# Patient Record
Sex: FEMALE | Race: WHITE
[De-identification: names, ages, dates, MRNs, and addresses within clinical notes are randomized per-mention and may not be internally consistent; named-entity substitution may affect disease eponyms.]

---

## 2018-10-01 ENCOUNTER — HOSPITAL ENCOUNTER (EMERGENCY)
Dept: HOSPITAL 74 - JER | Age: 53
LOS: 1 days | Discharge: HOME | End: 2018-10-02
Payer: COMMERCIAL

## 2018-10-01 VITALS — DIASTOLIC BLOOD PRESSURE: 75 MMHG | TEMPERATURE: 98 F | HEART RATE: 74 BPM | SYSTOLIC BLOOD PRESSURE: 127 MMHG

## 2018-10-01 VITALS — BODY MASS INDEX: 26.6 KG/M2

## 2018-10-01 DIAGNOSIS — L30.8: ICD-10-CM

## 2018-10-01 DIAGNOSIS — R21: Primary | ICD-10-CM

## 2018-10-02 NOTE — PDOC
Attending Attestation





- Resident


Resident Name: Claude Ramírez





- ED Attending Attestation


I have performed the following: I have examined & evaluated the patient, The 

case was reviewed & discussed with the resident, I agree w/resident's findings 

& plan, Exceptions are as noted





- HPI


HPI: 





54 yo F presents with rash to her face. She denies any new skin care products, 

foods,

## 2018-10-02 NOTE — PDOC
History of Present Illness





- General


Chief Complaint: Rash


Stated Complaint: RASH


Time Seen by Provider: 10/02/18 00:53





- History of Present Illness


Initial Comments: 





10/02/18 00:56


54 yo F with no significant pmh who p/w facial rash. Patient reports facial 

rash beginning Saturday 09/30/18. Rash is progressive, pruitic, involving the 

entire face, with absent involvement of eyes, or mouth. Symptoms not relieved 

with Diphehydramine x 2 Monday (10/01/18). Denies new topical emollients, soap, 

shampoo, makeup, detergents, clothing, bedding. denies recent outdoor exposure, 

traveling, change in diet. No known food or drug allergies. Patient reports h/o 

similar symptoms x 1 year ago. 





Patient denies N/V, F,C, CP, cough, hoarseness, muffled voice, tongue swelling, 

difficulty swallowing, wheezing, hemoptysis, Palpitations, SOB, urinary 

complaints, abdominal pain, diarrhea, constipation, lightheadedness, weakness, 

sensory changes. 





PMHx: as noted above


ROS: as noted


SHx: Denies Etoh, tobacco, IVDA


Allergies: NKDA














Past History





- Past Medical History


Allergies/Adverse Reactions: 


 Allergies











Allergy/AdvReac Type Severity Reaction Status Date / Time


 


No Known Allergies Allergy   Verified 10/01/18 23:16











Home Medications: 


Ambulatory Orders





Fluconazole [Diflucan] 200 mg PO ONCE #0 tablet 09/01/13 


Nitrofurantoin Monohyd/M-Cryst [Macrobid] 100 mg PO BID #14 capsule 09/01/13 


Prednisone [Prednisone 50 MG TABLETS] 50 mg PO DAILY #5 tablet MDD 1 tab 10/02/

18 








COPD: No





- Suicide/Smoking/Psychosocial Hx


Smoking Status: No


Smoking History: Never smoked


Number of Cigarettes Smoked Daily: 0





**Review of Systems





- Review of Systems


Comments:: 





10/02/18 00:56


GENERAL/CONSTITUTIONAL: No fever or chills. No weakness.


HEAD, EYES, EARS, NOSE AND THROAT: No change in vision. No ear pain or 

discharge. No sore throat.


CARDIOVASCULAR: No chest pain or shortness of breath


RESPIRATORY: No cough, wheezing, or hemoptysis.


GASTROINTESTINAL: No nausea, vomiting, diarrhea or constipation.


GENITOURINARY: No dysuria, frequency, or change in urination.


MUSCULOSKELETAL: No joint or muscle swelling or pain. No neck or back pain.


SKIN:+ Facial Rash.


NEUROLOGIC: No headache, vertigo, loss of consciousness, or change in strength/

sensation.


ENDOCRINE: No increased thirst. No abnormal weight change


HEMATOLOGIC/LYMPHATIC: No anemia, easy bleeding, or history of blood clots.


ALLERGIC/IMMUNOLOGIC: No hives or skin allergy.











*Physical Exam





- Vital Signs


 Last Vital Signs











Temp Pulse Resp BP Pulse Ox


 


 98 F   74   18   127/75   99 


 


 10/01/18 23:13  10/01/18 23:13  10/01/18 23:13  10/01/18 23:13  10/01/18 23:13














- Physical Exam


Comments: 





10/02/18 00:56


GENERAL: Awake, alert, and fully oriented, in no acute distress


HEAD: No signs of trauma, normocephalic, atraumatic 


FACE: Widespread erythematous, pruitic, papular, urticarial rash with perioral 

involvement/predominance, and sparing of forehead, eyes, mouth, mucosa. Absent 

crusting/oozing. 


EYES: PERRLA, EOMI, sclera anicteric, conjunctiva clear


ENT: Auricles normal inspection, hearing grossly normal, nares patent, 

oropharynx clear without


exudates. Moist mucosa


NECK: Normal ROM, supple, no lymphadenopathy, JVD, or masses


LUNGS: No distress, speaks full sentences, clear to auscultation bilaterally 


HEART: Regular rate and rhythm, normal S1 and S2, no murmurs, rubs or gallops, 

peripheral pulses normal and equal bilaterally. 


EXTREMITIES : Normal inspection, Normal range of motion, no edema.  No clubbing 

or cyanosis. 


SKIN: Warm, Dry, normal turgor.








Medical Decision Making





- Medical Decision Making





10/02/18 01:34


54 yo F with no significant pmh who p/w facial rash. VSS, AF. Lungs CTA. + 

diffuse facial papular, urticaria, with sparing of mucosa, and eyes. Low 

suspicion anaphylaxis. Able to tolerate oral secretions. Absent hoarseness, 

muffled voice, or mucosal edema. Low suspicion cellulitis. Likely acute atopic 

or contact dermatitis. 





Ed Course: 


Ranitidine, Prednisone 60 mg 


Sent Prednisone to pharmacy x 5 days


Patient stable for d/c with return precautions. 





*DC/Admit/Observation/Transfer


Diagnosis at time of Disposition: 


 Contact allergic reaction, Facial rash








- Discharge Dispostion


Disposition: HOME


Condition at time of disposition: Stable


Decision to Admit order: No





- Referrals


Referrals: 


Arturo Galo MD [Primary Care Provider] - 





- Patient Instructions


Printed Discharge Instructions:  DI for Rash


Additional Instructions: 


Please return to the emergency department with any new or worsening symptoms or 

concerns. Please follow up with your primary care physician within 72 hours.


Please take Prednisone daily for 5 days. 














- Post Discharge Activity





- Attestations


Physician Attestion: 





10/02/18 00:57


I attest to the information provided in this note.

## 2019-01-19 ENCOUNTER — HOSPITAL ENCOUNTER (EMERGENCY)
Dept: HOSPITAL 74 - JERFT | Age: 54
Discharge: HOME | End: 2019-01-19
Payer: COMMERCIAL

## 2019-01-19 VITALS — BODY MASS INDEX: 26.5 KG/M2

## 2019-01-19 VITALS — SYSTOLIC BLOOD PRESSURE: 125 MMHG | DIASTOLIC BLOOD PRESSURE: 58 MMHG | TEMPERATURE: 97.6 F | HEART RATE: 74 BPM

## 2019-01-19 DIAGNOSIS — J11.1: Primary | ICD-10-CM

## 2019-01-19 NOTE — PDOC
History of Present Illness





- General


Chief Complaint: Cold Symptoms


Stated Complaint: COUGHING


Time Seen by Provider: 01/19/19 19:25


History Source: Patient


Exam Limitations: No Limitations





- History of Present Illness


Initial Comments: 





01/19/19 19:32


Complaints of cough, fevers, chills, body aches, sore throat pain, 

nonproductive cough. 2 days


Timing/Duration: reports: getting worse


Severity: reports: moderate


Associated Symptoms: reports: chest pain/soreness, cough, fever/chills, muscle 

aches, nasal congestion, wheezing





Past History





- Travel


Traveled outside of the country in the last 30 days: No


Close contact w/someone who was outside of country & ill: No





- Past Medical History


Allergies/Adverse Reactions: 


 Allergies











Allergy/AdvReac Type Severity Reaction Status Date / Time


 


No Known Allergies Allergy   Verified 01/19/19 19:16











Home Medications: 


Ambulatory Orders





Oseltamivir Phosphate [Tamiflu -] 75 mg PO BID #10 capsule 01/19/19 








COPD: No





- Suicide/Smoking/Psychosocial Hx


Smoking Status: No


Smoking History: Never smoked


Have you smoked in the past 12 months: No


Number of Cigarettes Smoked Daily: 0


Information on smoking cessation initiated: No


Hx Alcohol Use: No


Drug/Substance Use Hx: No





**Review of Systems





- Review of Systems


Able to Perform ROS?: Yes


Is the patient limited English proficient: Yes


Constitutional: Yes: Symptoms Reported, See HPI, Chills, Fever, Loss of Appetite

, Malaise


HEENTM: Yes: See HPI, Nose Congestion.  No: Symptoms Reported


Respiratory: Yes: See HPI, Cough.  No: Symptoms reported


Musculoskeletal: Yes: Symptoms Reported, See HPI, Muscle Pain, Muscle Weakness


Integumentary: No: Symptoms Reported


All Other Systems: Reviewed and Negative





*Physical Exam





- Vital Signs


 Last Vital Signs











Temp Pulse Resp BP Pulse Ox


 


 97.6 F   74   16   125/58 L  100 


 


 01/19/19 19:14  01/19/19 19:14  01/19/19 19:14  01/19/19 19:14  01/19/19 19:14














- Physical Exam


Comments: 





01/19/19 19:33





GENERAL: [The child is awake, alert, and appropriately interactive.]


EYES: [The pupils are equal, round, and reactive to light, with clear, 

conjunctiva.but glassy]


NOSE: [The nose with clear drainage


EARS: [The ear canals and tympanic membranes are congested but landmarks easily 

visualed ]


THROAT: [The oropharynx is clear with erythema, no exudates. The mucous 

membranes are moist.]


NECK: [The neck is supple with mildly tender adenopathy, no menigemous]


CHEST: [The lungs are coarse but clear without crackles, or wheezes.]


HEART: [Heart is regular rhythm, with normal S1 and S2, no murmurs.]


ABDOMEN: [The abdomen is soft and nontender with normal bowel sounds. There is 

no organomegaly and no mass. There is no guarding or rebound.]


EXTREMITIES: [Extremities are normal.]


NEURO: [Behavior is normal for age.cranky but easily,m  Tone is normal.]


SKIN: [Skin is unremarkable without rash or swelling. There is no bruising, and 

there are no other signs of injury.]





Moderate Sedation





- Procedure Monitoring


Vital Signs: 


Procedure Monitoring Vital Signs











Temperature  97.6 F   01/19/19 19:14


 


Pulse Rate  74   01/19/19 19:14


 


Respiratory Rate  16   01/19/19 19:14


 


Blood Pressure  125/58 L  01/19/19 19:14


 


O2 Sat by Pulse Oximetry (%)  100   01/19/19 19:14











Progress Note





- Progress Note


Progress Note: 





Influenza type disease clinically suspect for influenza therefore will treat 

with Tamiflu is within window





*DC/Admit/Observation/Transfer


Diagnosis at time of Disposition: 


 Influenzal acute upper respiratory infection








- Discharge Dispostion


Disposition: HOME


Condition at time of disposition: Stable


Decision to Admit order: No





- Referrals


Referrals: 


Arturo Galo MD [Primary Care Provider] - 





- Patient Instructions


Printed Discharge Instructions:  DI for Viral Upper Respiratory Infection -- 

Adult


Additional Instructions: 


Rest, drink lots of fluids: Teas, water, soups, Pedialyte


Saltwater gargles


Steamy showers/seem to face break up mucus


Old-fashioned treatments help!


Avoid contact with others until fevers and cough resolved as this is very 

contagious


Lots of handwashing and good hygiene





Continue over-the-counter medications for symptomatic relief


Tylenol or Motrin for fever and pain


Take all of Tamiflu as directed: 1 tab every 12 hours for 5 days





Followup with private physician in one to 2 days as needed or if worsening


Return to emergency department for worsened symptoms, fevers, dehydration


Influenza takes between 5 and 7 days for resolution


To not participate in any activity, work, or school until fevers and cough are 

gone for at least one day





- Post Discharge Activity


Forms/Work/School Notes:  Back to Work

## 2020-08-06 ENCOUNTER — HOSPITAL ENCOUNTER (EMERGENCY)
Dept: HOSPITAL 74 - JERFT | Age: 55
Discharge: HOME | End: 2020-08-06
Payer: COMMERCIAL

## 2020-08-06 VITALS — BODY MASS INDEX: 27.4 KG/M2

## 2020-08-06 VITALS — HEART RATE: 99 BPM | TEMPERATURE: 98.3 F | SYSTOLIC BLOOD PRESSURE: 148 MMHG | DIASTOLIC BLOOD PRESSURE: 82 MMHG

## 2020-08-06 DIAGNOSIS — H11.31: Primary | ICD-10-CM

## 2020-08-06 NOTE — PDOC
History of Present Illness





- General


Chief Complaint: Eye Problem


Stated Complaint: RT. EYE PAIN


Time Seen by Provider: 08/06/20 10:59


History Source: Patient


Exam Limitations: No Limitations





- History of Present Illness


Initial Comments: 





08/06/20 11:55


55-year-old female history of seasonal allergies on loratadine presents 

complaining of erythema to right eye x 5 days.  Reports that prior to noticing 

redness she may have rubbed her eye but is unsure.  Denies pain, eye discharge, 

matting, fever, chills, headache, ear pain, cough, body aches, chest pain, 

shortness of breath, abdominal pain or any other complaint.  Patient does not 

wear corrective lenses or use contacts.





ROS: as above





PE:


GENERAL: well-appearing, NAD


HEAD: NCAT


EYES: VA OS 20/30, OD 20/30, OU 20/25, Pupils equal, round and reactive to 

light, erythematous conjunctiva, no drainage noted


ENT: pharynx: no erythema, no exudate, uvula midline


NECK: supple


CHEST: nontender


RESP: clear, no w/r/r


CARDIO: rrr, no m/g/r


ABD: +BS, soft, nontender, non distended


BACK: no midline spinal ttp, no CVAT 


EXTREMITIES: Normal range of motion, no edema


NEUROLOGICAL: Normal speech, normal gait


SKIN: Warm, Dry


Is this a multiple visit Asthma Patient?: No





Past History





- Medical History


Allergies/Adverse Reactions: 


                                    Allergies











Allergy/AdvReac Type Severity Reaction Status Date / Time


 


No Known Allergies Allergy   Verified 08/06/20 10:50











Home Medications: 


Ambulatory Orders





Oseltamivir Phosphate [Tamiflu -] 75 mg PO BID #10 capsule 01/19/19 








COPD: No





- Reproductive History


Is Patient Pregnant Now?: No





- Psycho-Social/Smoking History


Smoking Status: No


Smoking History: Never smoked


Have you smoked in the past 12 months: No


Number of Cigarettes Smoked Daily: 0


Information on smoking cessation initiated: No





- Substance Abuse Hx (Audit-C & DAST Scrn)


How often the patient has a drink containing alcohol: Never


Score: In Men: 4 or > Positive; In Women: 3 or > Positive: 0


Screen Result (Pos requires Nsg. Audit-10AR): Negative


In the last yr the pt used illegal drug/Rx for NonMed reason: No


Score:  Yes response is considered Positive: 0


Screen Result (Positive result requires Nsg. DAST-10): Negative





*Physical Exam





- Vital Signs


                                Last Vital Signs











Temp Pulse Resp BP Pulse Ox


 


 98.3 F   99 H  17   148/82   100 


 


 08/06/20 10:47  08/06/20 10:47  08/06/20 10:47  08/06/20 10:47  08/06/20 10:47














Medical Decision Making





- Medical Decision Making





08/06/20 12:03


55-year-old female history of seasonal allergies on loratadine presents 

complaining of erythema to right eye x 5 days.  Reports that prior to noticing 

redness she may have rubbed her eye but is unsure.  Denies pain, eye discharge, 

matting, fever, chills, headache, ear pain, cough, body aches, chest pain, 

shortness of breath, abdominal pain or any other complaint.  Patient does not 

wear corrective lenses or use contacts.





Exam consistent with subconjunctival hemorrhage


Explained to patient that this is self-limiting


Stable for discharge





Discharge





- Discharge Information


Problems reviewed: Yes


Clinical Impression/Diagnosis: 


 Subconjunctival hemorrhage of right eye





Condition: Stable


Disposition: HOME





- Admission


No





- Follow up/Referral


Referrals: 


Arturo Galo MD [Primary Care Provider] - 





- Patient Discharge Instructions


Additional Instructions: 


Follow-up with your primary care doctor within 1 week


If you develop eye pain, fever, chills, drainage or matting to right eye return 

to ED





- Post Discharge Activity

## 2022-01-02 ENCOUNTER — HOSPITAL ENCOUNTER (EMERGENCY)
Dept: HOSPITAL 74 - JERFT | Age: 57
Discharge: HOME | End: 2022-01-02
Payer: COMMERCIAL

## 2022-01-02 VITALS — SYSTOLIC BLOOD PRESSURE: 122 MMHG | HEART RATE: 95 BPM | TEMPERATURE: 97 F | DIASTOLIC BLOOD PRESSURE: 76 MMHG

## 2022-01-02 VITALS — BODY MASS INDEX: 27.8 KG/M2

## 2022-01-02 DIAGNOSIS — N39.0: Primary | ICD-10-CM

## 2022-01-02 LAB
APPEARANCE UR: CLEAR
BILIRUB UR STRIP.AUTO-MCNC: NEGATIVE MG/DL
COLOR UR: YELLOW
KETONES UR QL STRIP: NEGATIVE
LEUKOCYTE ESTERASE UR QL STRIP.AUTO: NEGATIVE
NITRITE UR QL STRIP: NEGATIVE
PH UR: 5.5 [PH] (ref 5–8)
PROT UR QL STRIP: NEGATIVE
PROT UR QL STRIP: NEGATIVE
SP GR UR: 1.01 (ref 1.01–1.03)
UROBILINOGEN UR STRIP-MCNC: 0.2 MG/DL (ref 0.2–1)

## 2022-01-28 PROBLEM — Z00.00 ENCOUNTER FOR PREVENTIVE HEALTH EXAMINATION: Status: ACTIVE | Noted: 2022-01-28

## 2022-01-31 ENCOUNTER — APPOINTMENT (OUTPATIENT)
Dept: HEART AND VASCULAR | Facility: CLINIC | Age: 57
End: 2022-01-31
Payer: COMMERCIAL

## 2022-01-31 VITALS — SYSTOLIC BLOOD PRESSURE: 112 MMHG | DIASTOLIC BLOOD PRESSURE: 78 MMHG | HEART RATE: 80 BPM

## 2022-01-31 PROCEDURE — 99203 OFFICE O/P NEW LOW 30 MIN: CPT

## 2022-01-31 NOTE — ASSESSMENT
[FreeTextEntry1] : 55-year-old female with past medical history of hyperlipidemia here for first evaluation with this provider\par \par ROUTINE CARDIOLOGY EXAMINATION\par -Patient has excellent exercise tolerance without any symptoms\par -Blood pressure is well controlled off medications\par -Recommend to follow-up with a repeat lipid panel in 4 months as per PMD\par -Echocardiogram and carotid duplex are within normal limits\par \par Follow-up in 6 months or sooner if any symptoms

## 2022-01-31 NOTE — REASON FOR VISIT
[FreeTextEntry1] : 55-year-old female with past medical history of hyperlipidemia here for first evaluation with this provider\par The patient denies any chest pain, shortness of breath, palpitations, syncope, presyncope.\par She reports excellent exercise tolerance\par She is not taking any medications\par \par \par PMH\par HLD\par \par PSH\par hysterectomy\par \par ALL\par NKDA\par \par MEDS\par none\par \par FH\par not contributory\par \par SH\par no smoke, no etoh, no drugs \par \par Echocardiogram 1/10/2022\par Normal left ventricular systolic function\par Mild MR, mild TR\par \par Carotids 1/10/2022\par Normal carotid duplex with no evidence of atherosclerotic disease\par \par EKG 1/10/2022\par SR, wnl \par \par Labs 1/11/2022\par Creatinine 0.8\par ALT 63\par Cholesterol 234\par HDL 55\par Triglycerides 179\par

## 2022-04-15 ENCOUNTER — HOSPITAL ENCOUNTER (EMERGENCY)
Dept: HOSPITAL 74 - JER | Age: 57
LOS: 1 days | Discharge: HOME | End: 2022-04-16
Payer: COMMERCIAL

## 2022-04-15 VITALS — BODY MASS INDEX: 28 KG/M2

## 2022-04-15 VITALS — DIASTOLIC BLOOD PRESSURE: 74 MMHG | SYSTOLIC BLOOD PRESSURE: 122 MMHG | TEMPERATURE: 98.3 F | HEART RATE: 79 BPM

## 2022-04-15 DIAGNOSIS — N30.01: Primary | ICD-10-CM

## 2022-04-16 LAB
APPEARANCE UR: CLEAR
BILIRUB UR STRIP.AUTO-MCNC: NEGATIVE MG/DL
COLOR UR: YELLOW
KETONES UR QL STRIP: NEGATIVE
LEUKOCYTE ESTERASE UR QL STRIP.AUTO: (no result)
NITRITE UR QL STRIP: NEGATIVE
PH UR: 6 [PH] (ref 5–8)
PROT UR QL STRIP: NEGATIVE
PROT UR QL STRIP: NEGATIVE
SP GR UR: 1 (ref 1.01–1.03)
UROBILINOGEN UR STRIP-MCNC: 0.2 MG/DL (ref 0.2–1)

## 2023-07-26 ENCOUNTER — HOSPITAL ENCOUNTER (EMERGENCY)
Dept: HOSPITAL 74 - JER | Age: 58
Discharge: HOME | End: 2023-07-26
Payer: COMMERCIAL

## 2023-07-26 VITALS
HEART RATE: 75 BPM | RESPIRATION RATE: 20 BRPM | TEMPERATURE: 98.1 F | DIASTOLIC BLOOD PRESSURE: 80 MMHG | SYSTOLIC BLOOD PRESSURE: 126 MMHG

## 2023-07-26 VITALS — BODY MASS INDEX: 28.1 KG/M2

## 2023-07-26 DIAGNOSIS — R10.13: Primary | ICD-10-CM

## 2023-07-26 LAB
ALBUMIN SERPL-MCNC: 4 G/DL (ref 3.4–5)
ALP SERPL-CCNC: 207 U/L (ref 45–117)
ALT SERPL-CCNC: 267 U/L (ref 13–61)
ANION GAP SERPL CALC-SCNC: 6 MMOL/L (ref 8–16)
APTT BLD: 32.8 SECONDS (ref 25.2–36.5)
AST SERPL-CCNC: 467 U/L (ref 15–37)
BASOPHILS # BLD: 0.3 % (ref 0–2)
BILIRUB SERPL-MCNC: 0.7 MG/DL (ref 0.2–1)
BNP SERPL-MCNC: 77.6 PG/ML (ref 5–125)
BUN SERPL-MCNC: 11.8 MG/DL (ref 7–18)
CALCIUM SERPL-MCNC: 9.7 MG/DL (ref 8.5–10.1)
CHLORIDE SERPL-SCNC: 108 MMOL/L (ref 98–107)
CO2 SERPL-SCNC: 30 MMOL/L (ref 21–32)
CREAT SERPL-MCNC: 0.7 MG/DL (ref 0.55–1.3)
DEPRECATED RDW RBC AUTO: 13.2 % (ref 11.6–15.6)
EOSINOPHIL # BLD: 0.6 % (ref 0–4.5)
GLUCOSE SERPL-MCNC: 133 MG/DL (ref 74–106)
HCT VFR BLD CALC: 41.9 % (ref 32.4–45.2)
HGB BLD-MCNC: 14 GM/DL (ref 10.7–15.3)
INR BLD: 0.91 (ref 0.83–1.09)
LIPASE SERPL-CCNC: 202 U/L (ref 73–393)
LYMPHOCYTES # BLD: 17.2 % (ref 8–40)
MAGNESIUM SERPL-MCNC: 2.4 MG/DL (ref 1.8–2.4)
MCH RBC QN AUTO: 29 PG (ref 25.7–33.7)
MCHC RBC AUTO-ENTMCNC: 33.4 G/DL (ref 32–36)
MCV RBC: 86.9 FL (ref 80–96)
MONOCYTES # BLD AUTO: 5.3 % (ref 3.8–10.2)
NEUTROPHILS # BLD: 76.6 % (ref 42.8–82.8)
PLATELET # BLD AUTO: 242 10^3/UL (ref 134–434)
PMV BLD: 8 FL (ref 7.5–11.1)
POTASSIUM SERPLBLD-SCNC: 4 MMOL/L (ref 3.5–5.1)
PROT SERPL-MCNC: 8 G/DL (ref 6.4–8.2)
PT PNL PPP: 10.6 SEC (ref 9.7–13)
RBC # BLD AUTO: 4.81 M/MM3 (ref 3.6–5.2)
SODIUM SERPL-SCNC: 144 MMOL/L (ref 136–145)
WBC # BLD AUTO: 11.8 K/MM3 (ref 4–10)

## 2023-09-03 ENCOUNTER — HOSPITAL ENCOUNTER (EMERGENCY)
Dept: HOSPITAL 74 - JER | Age: 58
LOS: 1 days | Discharge: HOME | End: 2023-09-04
Payer: COMMERCIAL

## 2023-09-03 VITALS
DIASTOLIC BLOOD PRESSURE: 71 MMHG | SYSTOLIC BLOOD PRESSURE: 106 MMHG | RESPIRATION RATE: 18 BRPM | HEART RATE: 67 BPM | TEMPERATURE: 97.8 F

## 2023-09-03 VITALS — BODY MASS INDEX: 27.1 KG/M2

## 2023-09-03 DIAGNOSIS — K29.00: ICD-10-CM

## 2023-09-03 DIAGNOSIS — R11.2: ICD-10-CM

## 2023-09-03 DIAGNOSIS — R10.13: Primary | ICD-10-CM

## 2023-09-03 DIAGNOSIS — K80.20: ICD-10-CM

## 2023-09-04 LAB
ALBUMIN SERPL-MCNC: 3.8 G/DL (ref 3.4–5)
ALP SERPL-CCNC: 167 U/L (ref 45–117)
ALT SERPL-CCNC: 228 U/L (ref 13–61)
ANION GAP SERPL CALC-SCNC: 9 MMOL/L (ref 8–16)
APTT BLD: 27.1 SECONDS (ref 25.2–36.5)
AST SERPL-CCNC: 397 U/L (ref 15–37)
BASOPHILS # BLD: 0.4 % (ref 0–2)
BILIRUB SERPL-MCNC: 0.7 MG/DL (ref 0.2–1)
BUN SERPL-MCNC: 16.5 MG/DL (ref 7–18)
CALCIUM SERPL-MCNC: 9.2 MG/DL (ref 8.5–10.1)
CHLORIDE SERPL-SCNC: 109 MMOL/L (ref 98–107)
CO2 SERPL-SCNC: 25 MMOL/L (ref 21–32)
CREAT SERPL-MCNC: 0.8 MG/DL (ref 0.55–1.3)
DEPRECATED RDW RBC AUTO: 13.3 % (ref 11.6–15.6)
EOSINOPHIL # BLD: 0.1 % (ref 0–4.5)
GLUCOSE SERPL-MCNC: 169 MG/DL (ref 74–106)
HCT VFR BLD CALC: 39.4 % (ref 32.4–45.2)
HGB BLD-MCNC: 13.1 GM/DL (ref 10.7–15.3)
INR BLD: 0.98 (ref 0.83–1.09)
LIPASE SERPL-CCNC: 214 U/L (ref 73–393)
LYMPHOCYTES # BLD: 9.2 % (ref 8–40)
MAGNESIUM SERPL-MCNC: 2.3 MG/DL (ref 1.8–2.4)
MCH RBC QN AUTO: 29 PG (ref 25.7–33.7)
MCHC RBC AUTO-ENTMCNC: 33.2 G/DL (ref 32–36)
MCV RBC: 87.5 FL (ref 80–96)
MONOCYTES # BLD AUTO: 5.2 % (ref 3.8–10.2)
NEUTROPHILS # BLD: 85.1 % (ref 42.8–82.8)
PLATELET # BLD AUTO: 220 10^3/UL (ref 134–434)
PMV BLD: 7.9 FL (ref 7.5–11.1)
POTASSIUM SERPLBLD-SCNC: 4.1 MMOL/L (ref 3.5–5.1)
PROT SERPL-MCNC: 7.5 G/DL (ref 6.4–8.2)
PT PNL PPP: 11.4 SEC (ref 9.7–13)
RBC # BLD AUTO: 4.51 M/MM3 (ref 3.6–5.2)
SODIUM SERPL-SCNC: 144 MMOL/L (ref 136–145)
WBC # BLD AUTO: 11.1 K/MM3 (ref 4–10)

## 2023-09-04 PROCEDURE — 3E033GC INTRODUCTION OF OTHER THERAPEUTIC SUBSTANCE INTO PERIPHERAL VEIN, PERCUTANEOUS APPROACH: ICD-10-PCS

## 2023-09-08 ENCOUNTER — HOSPITAL ENCOUNTER (OUTPATIENT)
Dept: HOSPITAL 74 - JASU-SURG | Age: 58
Discharge: HOME | End: 2023-09-08
Attending: SURGERY
Payer: COMMERCIAL

## 2023-09-08 VITALS — TEMPERATURE: 96.9 F | SYSTOLIC BLOOD PRESSURE: 117 MMHG | DIASTOLIC BLOOD PRESSURE: 75 MMHG | HEART RATE: 67 BPM

## 2023-09-08 VITALS — RESPIRATION RATE: 16 BRPM

## 2023-09-08 VITALS — BODY MASS INDEX: 28 KG/M2

## 2023-09-08 DIAGNOSIS — K80.10: Primary | ICD-10-CM

## 2023-09-08 PROCEDURE — 0FT44ZZ RESECTION OF GALLBLADDER, PERCUTANEOUS ENDOSCOPIC APPROACH: ICD-10-PCS | Performed by: SURGERY
